# Patient Record
Sex: MALE | Race: WHITE | NOT HISPANIC OR LATINO | Employment: FULL TIME | URBAN - METROPOLITAN AREA
[De-identification: names, ages, dates, MRNs, and addresses within clinical notes are randomized per-mention and may not be internally consistent; named-entity substitution may affect disease eponyms.]

---

## 2017-07-14 ENCOUNTER — APPOINTMENT (OUTPATIENT)
Dept: URGENT CARE | Age: 57
End: 2017-07-14
Payer: OTHER MISCELLANEOUS

## 2017-07-14 ENCOUNTER — HOSPITAL ENCOUNTER (EMERGENCY)
Facility: HOSPITAL | Age: 57
Discharge: HOME | End: 2017-07-14
Attending: EMERGENCY MEDICINE
Payer: OTHER MISCELLANEOUS

## 2017-07-14 ENCOUNTER — TRANSCRIBE ORDERS (OUTPATIENT)
Dept: URGENT CARE | Age: 57
End: 2017-07-14

## 2017-07-14 ENCOUNTER — APPOINTMENT (EMERGENCY)
Dept: RADIOLOGY | Facility: HOSPITAL | Age: 57
End: 2017-07-14
Payer: OTHER MISCELLANEOUS

## 2017-07-14 VITALS
BODY MASS INDEX: 27.11 KG/M2 | HEIGHT: 69 IN | HEART RATE: 68 BPM | RESPIRATION RATE: 18 BRPM | WEIGHT: 183 LBS | DIASTOLIC BLOOD PRESSURE: 71 MMHG | SYSTOLIC BLOOD PRESSURE: 113 MMHG | TEMPERATURE: 97.7 F | OXYGEN SATURATION: 97 %
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
## 2017-07-14 DIAGNOSIS — M47.817 DJD (DEGENERATIVE JOINT DISEASE), LUMBOSACRAL: ICD-10-CM

## 2017-07-14 DIAGNOSIS — S39.012A LUMBOSACRAL STRAIN, INITIAL ENCOUNTER: Primary | ICD-10-CM

## 2017-07-14 PROCEDURE — 99284 EMERGENCY DEPT VISIT MOD MDM: CPT

## 2017-07-14 PROCEDURE — 51798 US URINE CAPACITY MEASURE: CPT

## 2017-07-14 PROCEDURE — 99283 EMERGENCY DEPT VISIT LOW MDM: CPT | Performed by: PREVENTIVE MEDICINE

## 2017-07-14 PROCEDURE — 74176 CT ABD & PELVIS W/O CONTRAST: CPT

## 2017-07-14 PROCEDURE — G0382 LEV 3 HOSP TYPE B ED VISIT: HCPCS | Performed by: PREVENTIVE MEDICINE

## 2017-07-24 ENCOUNTER — APPOINTMENT (OUTPATIENT)
Dept: URGENT CARE | Age: 57
End: 2017-07-24
Payer: OTHER MISCELLANEOUS

## 2017-07-24 PROCEDURE — 99214 OFFICE O/P EST MOD 30 MIN: CPT | Performed by: PREVENTIVE MEDICINE

## 2017-08-03 ENCOUNTER — APPOINTMENT (OUTPATIENT)
Dept: PHYSICAL THERAPY | Facility: CLINIC | Age: 57
End: 2017-08-03
Payer: OTHER MISCELLANEOUS

## 2017-08-03 PROCEDURE — 97140 MANUAL THERAPY 1/> REGIONS: CPT

## 2017-08-03 PROCEDURE — 97162 PT EVAL MOD COMPLEX 30 MIN: CPT

## 2017-08-07 ENCOUNTER — APPOINTMENT (OUTPATIENT)
Dept: PHYSICAL THERAPY | Facility: CLINIC | Age: 57
End: 2017-08-07
Payer: OTHER MISCELLANEOUS

## 2017-08-07 PROCEDURE — 97110 THERAPEUTIC EXERCISES: CPT

## 2017-08-09 ENCOUNTER — APPOINTMENT (OUTPATIENT)
Dept: LAB | Facility: HOSPITAL | Age: 57
End: 2017-08-09
Attending: UROLOGY
Payer: COMMERCIAL

## 2017-08-09 ENCOUNTER — ALLSCRIPTS OFFICE VISIT (OUTPATIENT)
Dept: OTHER | Facility: OTHER | Age: 57
End: 2017-08-09

## 2017-08-09 DIAGNOSIS — N30.00 ACUTE CYSTITIS WITHOUT HEMATURIA: ICD-10-CM

## 2017-08-09 LAB
BILIRUB UR QL STRIP: NORMAL
CLARITY UR: NORMAL
COLOR UR: YELLOW
GLUCOSE (HISTORICAL): NORMAL
HGB UR QL STRIP.AUTO: NORMAL
KETONES UR STRIP-MCNC: NORMAL MG/DL
LEUKOCYTE ESTERASE UR QL STRIP: NORMAL
NITRITE UR QL STRIP: NORMAL
PH UR STRIP.AUTO: 6 [PH]
PROT UR STRIP-MCNC: NORMAL MG/DL
SP GR UR STRIP.AUTO: 1.02
UROBILINOGEN UR QL STRIP.AUTO: 0.2

## 2017-08-09 PROCEDURE — 87086 URINE CULTURE/COLONY COUNT: CPT

## 2017-08-10 LAB — BACTERIA UR CULT: NORMAL

## 2017-08-14 ENCOUNTER — APPOINTMENT (OUTPATIENT)
Dept: PHYSICAL THERAPY | Facility: CLINIC | Age: 57
End: 2017-08-14
Payer: OTHER MISCELLANEOUS

## 2017-08-16 ENCOUNTER — APPOINTMENT (OUTPATIENT)
Dept: PHYSICAL THERAPY | Facility: CLINIC | Age: 57
End: 2017-08-16
Payer: OTHER MISCELLANEOUS

## 2017-08-16 PROCEDURE — 97110 THERAPEUTIC EXERCISES: CPT

## 2017-08-16 PROCEDURE — 97140 MANUAL THERAPY 1/> REGIONS: CPT

## 2017-08-17 ENCOUNTER — APPOINTMENT (OUTPATIENT)
Dept: PHYSICAL THERAPY | Facility: CLINIC | Age: 57
End: 2017-08-17
Payer: OTHER MISCELLANEOUS

## 2017-08-17 PROCEDURE — 97110 THERAPEUTIC EXERCISES: CPT

## 2017-08-28 ENCOUNTER — APPOINTMENT (OUTPATIENT)
Dept: PHYSICAL THERAPY | Facility: CLINIC | Age: 57
End: 2017-08-28
Payer: OTHER MISCELLANEOUS

## 2017-08-28 PROCEDURE — 97110 THERAPEUTIC EXERCISES: CPT

## 2017-08-28 PROCEDURE — 97140 MANUAL THERAPY 1/> REGIONS: CPT

## 2017-08-30 ENCOUNTER — APPOINTMENT (OUTPATIENT)
Dept: PHYSICAL THERAPY | Facility: CLINIC | Age: 57
End: 2017-08-30
Payer: OTHER MISCELLANEOUS

## 2017-08-30 PROCEDURE — 97140 MANUAL THERAPY 1/> REGIONS: CPT

## 2017-08-30 PROCEDURE — 97110 THERAPEUTIC EXERCISES: CPT

## 2017-09-05 ENCOUNTER — APPOINTMENT (OUTPATIENT)
Dept: PHYSICAL THERAPY | Facility: CLINIC | Age: 57
End: 2017-09-05
Payer: OTHER MISCELLANEOUS

## 2017-09-05 PROCEDURE — 97164 PT RE-EVAL EST PLAN CARE: CPT

## 2017-09-05 PROCEDURE — 97110 THERAPEUTIC EXERCISES: CPT

## 2017-09-07 ENCOUNTER — APPOINTMENT (OUTPATIENT)
Dept: PHYSICAL THERAPY | Facility: CLINIC | Age: 57
End: 2017-09-07
Payer: OTHER MISCELLANEOUS

## 2017-09-07 PROCEDURE — 97110 THERAPEUTIC EXERCISES: CPT

## 2017-09-07 PROCEDURE — 97140 MANUAL THERAPY 1/> REGIONS: CPT

## 2017-09-12 ENCOUNTER — APPOINTMENT (OUTPATIENT)
Dept: URGENT CARE | Age: 57
End: 2017-09-12
Payer: OTHER MISCELLANEOUS

## 2017-09-12 PROCEDURE — 99213 OFFICE O/P EST LOW 20 MIN: CPT | Performed by: PREVENTIVE MEDICINE

## 2017-09-14 ENCOUNTER — APPOINTMENT (OUTPATIENT)
Dept: PHYSICAL THERAPY | Facility: CLINIC | Age: 57
End: 2017-09-14
Payer: OTHER MISCELLANEOUS

## 2017-09-14 PROCEDURE — 97140 MANUAL THERAPY 1/> REGIONS: CPT

## 2017-09-14 PROCEDURE — 97110 THERAPEUTIC EXERCISES: CPT

## 2017-09-25 ENCOUNTER — ALLSCRIPTS OFFICE VISIT (OUTPATIENT)
Dept: OTHER | Facility: OTHER | Age: 57
End: 2017-09-25

## 2018-01-14 VITALS
DIASTOLIC BLOOD PRESSURE: 94 MMHG | SYSTOLIC BLOOD PRESSURE: 143 MMHG | HEART RATE: 78 BPM | WEIGHT: 180 LBS | HEIGHT: 69 IN | BODY MASS INDEX: 26.66 KG/M2

## 2018-01-22 VITALS
SYSTOLIC BLOOD PRESSURE: 128 MMHG | HEIGHT: 69 IN | WEIGHT: 177 LBS | BODY MASS INDEX: 26.22 KG/M2 | DIASTOLIC BLOOD PRESSURE: 78 MMHG

## 2018-02-09 ENCOUNTER — OFFICE VISIT (OUTPATIENT)
Dept: UROLOGY | Facility: MEDICAL CENTER | Age: 58
End: 2018-02-09
Payer: COMMERCIAL

## 2018-02-09 VITALS
BODY MASS INDEX: 26.66 KG/M2 | WEIGHT: 180 LBS | DIASTOLIC BLOOD PRESSURE: 74 MMHG | SYSTOLIC BLOOD PRESSURE: 112 MMHG | HEIGHT: 69 IN

## 2018-02-09 DIAGNOSIS — N13.8 BPH WITH OBSTRUCTION/LOWER URINARY TRACT SYMPTOMS: ICD-10-CM

## 2018-02-09 DIAGNOSIS — N40.1 BPH WITH OBSTRUCTION/LOWER URINARY TRACT SYMPTOMS: ICD-10-CM

## 2018-02-09 DIAGNOSIS — N41.0 PROSTATITIS, ACUTE: Primary | ICD-10-CM

## 2018-02-09 LAB
POST-VOID RESIDUAL VOLUME, ML POC: 76 ML
SL AMB  POCT GLUCOSE, UA: NEGATIVE
SL AMB LEUKOCYTE ESTERASE,UA: NEGATIVE
SL AMB POCT BILIRUBIN,UA: NEGATIVE
SL AMB POCT BLOOD,UA: NEGATIVE
SL AMB POCT CLARITY,UA: CLEAR
SL AMB POCT COLOR,UA: YELLOW
SL AMB POCT KETONES,UA: NEGATIVE
SL AMB POCT NITRITE,UA: NEGATIVE
SL AMB POCT PH,UA: 6.5
SL AMB POCT SPECIFIC GRAVITY,UA: 1.02
SL AMB POCT URINE PROTEIN: NEGATIVE
SL AMB POCT UROBILINOGEN: 0.2

## 2018-02-09 PROCEDURE — 99214 OFFICE O/P EST MOD 30 MIN: CPT | Performed by: UROLOGY

## 2018-02-09 PROCEDURE — 81003 URINALYSIS AUTO W/O SCOPE: CPT | Performed by: UROLOGY

## 2018-02-09 RX ORDER — SULFAMETHOXAZOLE AND TRIMETHOPRIM 800; 160 MG/1; MG/1
1 TABLET ORAL 2 TIMES DAILY
Qty: 28 TABLET | Refills: 0 | Status: SHIPPED | OUTPATIENT
Start: 2018-02-09 | End: 2018-03-09

## 2018-02-09 NOTE — LETTER
2018     MD Jacky Uriarte   [address]  [address] [facility]    Patient: Jaren Macedo   YOB: 1960   Date of Visit: 2018       Dear Dr Bennie Alexandre: Thank you for referring Jaren Macedo to me for evaluation  Below are my notes for this consultation  If you have questions, please do not hesitate to call me  I look forward to following your patient along with you  Sincerely,        Mike Goode MD        CC: No Recipients  Mike Goode MD  2018  5:03 PM  Sign at close encounter  Assessment/Plan:    Assessment:  1  Acute prostatitis  2  Marked changes in voiding symptoms  3  Marked changes in physical exam of the prostate when compared to prostate exam is dating back to      3   Family history of prostate cancer    Plan:  1  Bactrim DS, 1 b i d  for 4 weeks  2  Return in 6-8 weeks for reassessment  Make a decision on prostate ultrasound with biopsy at that time              Subjective:      Patient ID: Jaren Macedo is a 65 y o  male  HPI    This patient is followed annually for BPH and a family history of prostate cancer  Since his last visit in 2017, his urinary symptoms have worsened significantly  Primary problems include frequency, urgency and a weak urinary stream   He has no dysuria or hematuria  He has no systemic symptoms  The patient's father  of prostate cancer at the age of 80  The following portions of the patient's history were reviewed and updated as appropriate: allergies, current medications, past family history, past medical history, past social history, past surgical history and problem list     Review of Systems   Constitutional: Negative for activity change and fatigue  Respiratory: Negative for shortness of breath and wheezing  Cardiovascular: Negative for chest pain  Gastrointestinal: Negative for abdominal pain     Genitourinary: Negative for difficulty urinating, dysuria, frequency, hematuria and urgency  Musculoskeletal: Negative for back pain and gait problem  Skin: Negative  Allergic/Immunologic: Negative  Neurological: Negative  Psychiatric/Behavioral: Negative  Objective:    Vitals:    02/09/18 1444   BP: 112/74        Physical Exam   Constitutional: He is oriented to person, place, and time  He appears well-developed and well-nourished  HENT:   Head: Normocephalic and atraumatic  Pulmonary/Chest: Effort normal    Abdominal: Soft  Bowel sounds are normal    Genitourinary:   Genitourinary Comments: Prostate is approximately 50 g in size  The right lateral lobe is significantly larger than the left lateral lobe  The texture is mildly irregular and the patient seemed to have some discomfort during that phase of the exam   Findings are consistent with acute prostatitis  Musculoskeletal: Normal range of motion  Neurological: He is alert and oriented to person, place, and time  Skin: Skin is warm and dry  Psychiatric: He has a normal mood and affect   His behavior is normal  Judgment and thought content normal

## 2018-02-09 NOTE — PROGRESS NOTES
IPSS Questionnaire (AUA-7): Over the past month    1)  How often have you had a sensation of not emptying your bladder completely after you finish urinating? 2 - Less than half the time   2)  How often have you had to urinate again less than two hours after you finished urinating? 3 - About half the time   3)  How often have you found you stopped and started again several times when you urinated? 3 - About half the time   4) How difficult have you found it to postpone urination? 5 - Almost always   5) How often have you had a weak urinary stream?  4 - More than half the time   6) How often have you had to push or strain to begin urination? 3 - About half the time   7) How many times did you most typically get up to urinate from the time you went to bed until the time you got up in the morning?   2 - 2 times   Total Score:  22

## 2018-02-09 NOTE — LETTER
2018     MD Jacky Mccoy   [address]  [address] [facility]    Patient: Perla Amaya   YOB: 1960   Date of Visit: 2018       Dear Dr Cathy Sheth: Thank you for referring Perla Amaya to me for evaluation  Below are my notes for this consultation  If you have questions, please do not hesitate to call me  I look forward to following your patient along with you  Sincerely,        Iman Ellison MD        CC: No Recipients  Iman Ellison MD  2018  5:03 PM  Sign at close encounter  Assessment/Plan:    Assessment:  1  Acute prostatitis  2  Marked changes in voiding symptoms  3  Marked changes in physical exam of the prostate when compared to prostate exam is dating back to      3   Family history of prostate cancer    Plan:  1  Bactrim DS, 1 b i d  for 4 weeks  2  Return in 6-8 weeks for reassessment  Make a decision on prostate ultrasound with biopsy at that time              Subjective:      Patient ID: Perla Amaya is a 65 y o  male  HPI    This patient is followed annually for BPH and a family history of prostate cancer  Since his last visit in 2017, his urinary symptoms have worsened significantly  Primary problems include frequency, urgency and a weak urinary stream   He has no dysuria or hematuria  He has no systemic symptoms  The patient's father  of prostate cancer at the age of 80  The following portions of the patient's history were reviewed and updated as appropriate: allergies, current medications, past family history, past medical history, past social history, past surgical history and problem list     Review of Systems   Constitutional: Negative for activity change and fatigue  Respiratory: Negative for shortness of breath and wheezing  Cardiovascular: Negative for chest pain  Gastrointestinal: Negative for abdominal pain     Genitourinary: Negative for difficulty urinating, dysuria, frequency, hematuria and urgency  Musculoskeletal: Negative for back pain and gait problem  Skin: Negative  Allergic/Immunologic: Negative  Neurological: Negative  Psychiatric/Behavioral: Negative  Objective:    Vitals:    02/09/18 1444   BP: 112/74        Physical Exam   Constitutional: He is oriented to person, place, and time  He appears well-developed and well-nourished  HENT:   Head: Normocephalic and atraumatic  Pulmonary/Chest: Effort normal    Abdominal: Soft  Bowel sounds are normal    Genitourinary:   Genitourinary Comments: Prostate is approximately 50 g in size  The right lateral lobe is significantly larger than the left lateral lobe  The texture is mildly irregular and the patient seemed to have some discomfort during that phase of the exam   Findings are consistent with acute prostatitis  Musculoskeletal: Normal range of motion  Neurological: He is alert and oriented to person, place, and time  Skin: Skin is warm and dry  Psychiatric: He has a normal mood and affect   His behavior is normal  Judgment and thought content normal

## 2018-02-09 NOTE — PROGRESS NOTES
Assessment/Plan:    Assessment:  1  Acute prostatitis  2  Marked changes in voiding symptoms  3  Marked changes in physical exam of the prostate when compared to prostate exam is dating back to      3   Family history of prostate cancer    Plan:  1  Bactrim DS, 1 b i d  for 4 weeks  2  Return in 6-8 weeks for reassessment  Make a decision on prostate ultrasound with biopsy at that time              Subjective:      Patient ID: Eddie Orellana is a 65 y o  male  HPI    This patient is followed annually for BPH and a family history of prostate cancer  Since his last visit in 2017, his urinary symptoms have worsened significantly  Primary problems include frequency, urgency and a weak urinary stream   He has no dysuria or hematuria  He has no systemic symptoms  The patient's father  of prostate cancer at the age of 80  The following portions of the patient's history were reviewed and updated as appropriate: allergies, current medications, past family history, past medical history, past social history, past surgical history and problem list     Review of Systems   Constitutional: Negative for activity change and fatigue  Respiratory: Negative for shortness of breath and wheezing  Cardiovascular: Negative for chest pain  Gastrointestinal: Negative for abdominal pain  Genitourinary: Negative for difficulty urinating, dysuria, frequency, hematuria and urgency  Musculoskeletal: Negative for back pain and gait problem  Skin: Negative  Allergic/Immunologic: Negative  Neurological: Negative  Psychiatric/Behavioral: Negative  Objective:    Vitals:    18 1444   BP: 112/74        Physical Exam   Constitutional: He is oriented to person, place, and time  He appears well-developed and well-nourished  HENT:   Head: Normocephalic and atraumatic  Pulmonary/Chest: Effort normal    Abdominal: Soft   Bowel sounds are normal    Genitourinary:   Genitourinary Comments: Prostate is approximately 50 g in size  The right lateral lobe is significantly larger than the left lateral lobe  The texture is mildly irregular and the patient seemed to have some discomfort during that phase of the exam   Findings are consistent with acute prostatitis  Musculoskeletal: Normal range of motion  Neurological: He is alert and oriented to person, place, and time  Skin: Skin is warm and dry  Psychiatric: He has a normal mood and affect   His behavior is normal  Judgment and thought content normal

## 2018-04-18 ENCOUNTER — OFFICE VISIT (OUTPATIENT)
Dept: UROLOGY | Facility: MEDICAL CENTER | Age: 58
End: 2018-04-18
Payer: COMMERCIAL

## 2018-04-18 VITALS
DIASTOLIC BLOOD PRESSURE: 80 MMHG | SYSTOLIC BLOOD PRESSURE: 132 MMHG | WEIGHT: 180.8 LBS | BODY MASS INDEX: 26.78 KG/M2 | HEIGHT: 69 IN

## 2018-04-18 DIAGNOSIS — N41.0 ACUTE PROSTATITIS: ICD-10-CM

## 2018-04-18 DIAGNOSIS — R39.12 WEAK URINARY STREAM: Primary | ICD-10-CM

## 2018-04-18 LAB
SL AMB  POCT GLUCOSE, UA: NEGATIVE
SL AMB LEUKOCYTE ESTERASE,UA: NEGATIVE
SL AMB POCT BILIRUBIN,UA: NEGATIVE
SL AMB POCT BLOOD,UA: NEGATIVE
SL AMB POCT CLARITY,UA: CLEAR
SL AMB POCT COLOR,UA: YELLOW
SL AMB POCT KETONES,UA: NEGATIVE
SL AMB POCT NITRITE,UA: NEGATIVE
SL AMB POCT PH,UA: 6
SL AMB POCT SPECIFIC GRAVITY,UA: 1.02
SL AMB POCT URINE PROTEIN: NEGATIVE
SL AMB POCT UROBILINOGEN: 0.2

## 2018-04-18 PROCEDURE — 81003 URINALYSIS AUTO W/O SCOPE: CPT | Performed by: UROLOGY

## 2018-04-18 PROCEDURE — 99213 OFFICE O/P EST LOW 20 MIN: CPT | Performed by: UROLOGY

## 2018-04-18 RX ORDER — TAMSULOSIN HYDROCHLORIDE 0.4 MG/1
0.4 CAPSULE ORAL EVERY EVENING
Qty: 30 CAPSULE | Refills: 1 | Status: SHIPPED | OUTPATIENT
Start: 2018-04-18 | End: 2018-12-04 | Stop reason: DRUGHIGH

## 2018-04-18 RX ORDER — DICYCLOMINE HYDROCHLORIDE 10 MG/1
CAPSULE ORAL 3 TIMES DAILY PRN
Refills: 1 | COMMUNITY
Start: 2018-02-10

## 2018-04-18 NOTE — LETTER
April 18, 2018     Aury Petersen MD  [address] [facility]  [address] [place] [address]    Patient: Robert Thorne   YOB: 1960   Date of Visit: 4/18/2018       Dear Dr Flaca Leger: Thank you for referring Robert Thorne to me for evaluation  Below are my notes for this consultation  If you have questions, please do not hesitate to call me  I look forward to following your patient along with you  Sincerely,        Luis Montes MD        CC: No Recipients  Luis Montes MD  4/18/2018  3:25 PM  Sign at close encounter  Assessment/Plan:    Assessment:  1  Weak urinary stream  2  Urgency  3  Nocturia  4  Probable prostatic hyperplasia with obstruction accounting for symptoms above    Plan:  1  Trial of tamsulosin  2  Office cystoscopy    No problem-specific Assessment & Plan notes found for this encounter  Diagnoses and all orders for this visit:    Weak urinary stream    Acute prostatitis  -     POCT urine dip auto non-scope    Other orders  -     dicyclomine (BENTYL) 10 mg capsule; Take by mouth 3 (three) times a day as needed          Subjective:      Patient ID: Robert Thorne is a 65 y o  male  HPI     Bactrim helped somewhat  AUA Score is  no better  Discussed cysto and pt is now ready to have this one to reieve sx  The only other choice is to adjust intake, but that leaves the problem undiagnosed  No dysuria or blood  Will try tamsulosin until cysto  The following portions of the patient's history were reviewed and updated as appropriate: allergies, current medications, past family history, past medical history, past social history, past surgical history and problem list     Review of Systems   Constitutional: Negative for activity change and fatigue  Respiratory: Negative for shortness of breath and wheezing  Cardiovascular: Negative for chest pain  Gastrointestinal: Negative for abdominal pain     Genitourinary: Negative for difficulty urinating, dysuria, frequency, hematuria and urgency  Musculoskeletal: Negative for back pain and gait problem  Skin: Negative  Allergic/Immunologic: Negative  Neurological: Negative  Psychiatric/Behavioral: Negative  Objective:      /80 (BP Location: Left arm, Patient Position: Sitting, Cuff Size: Standard)   Ht 5' 9" (1 753 m)   Wt 82 kg (180 lb 12 8 oz)   BMI 26 70 kg/m²           Physical Exam   Constitutional: He is oriented to person, place, and time  He appears well-developed and well-nourished  HENT:   Head: Normocephalic and atraumatic  Neck: Normal range of motion  Neck supple  Pulmonary/Chest: Effort normal    Musculoskeletal: Normal range of motion  Neurological: He is alert and oriented to person, place, and time  He has normal reflexes  Skin: Skin is warm and dry  Psychiatric: He has a normal mood and affect   His behavior is normal  Judgment and thought content normal

## 2018-04-18 NOTE — PROGRESS NOTES
IPSS Questionnaire (AUA-7): Over the past month    1)  How often have you had a sensation of not emptying your bladder completely after you finish urinating? 2 - Less than half the time   2)  How often have you had to urinate again less than two hours after you finished urinating? 3 - About half the time   3)  How often have you found you stopped and started again several times when you urinated? 4 - More than half the time   4) How difficult have you found it to postpone urination? 5 - Almost always   5) How often have you had a weak urinary stream?  5 - Almost always   6) How often have you had to push or strain to begin urination? 3 - About half the time   7) How many times did you most typically get up to urinate from the time you went to bed until the time you got up in the morning? 2 - 2 times   Total Score:  24     QOL: Mostly dissatisfied

## 2018-04-18 NOTE — PATIENT INSTRUCTIONS
Tamsulosin (By mouth)   Tamsulosin Hydrochloride ([id number]-sin [id number]-droe-KLOR-herson)  Treats benign prostatic hyperplasia (enlarged prostate)  Brand Name(s): Flomax   There may be other brand names for this medicine  When This Medicine Should Not Be Used: This medicine is not right for everyone  Do not use it if you had an allergic reaction to tamsulosin  How to Use This Medicine:   Capsule  · Take your medicine as directed  Your dose may need to be changed several times to find what works best for you  · Take this medicine 30 minutes after the same meal each day  Swallow the capsule whole  Do not crush, chew, or open it  · Read and follow the patient instructions that come with this medicine  Talk to your doctor or pharmacist if you have any questions  · Missed dose: Take a dose as soon as you remember  If it is almost time for your next dose, wait until then and take a regular dose  Do not take extra medicine to make up for a missed dose  If you forget to take this medicine for several days in a row, talk to your doctor before you start taking it again  · Store the medicine in a closed container at room temperature, away from heat, moisture, and direct light  Drugs and Foods to Avoid:   Ask your doctor or pharmacist before using any other medicine, including over-the-counter medicines, vitamins, and herbal products  · Some medicines can affect how tamsulosin works  Tell your doctor if you are using another alpha blocker medicine, cimetidine, erythromycin, ketoconazole, paroxetine, terbinafine, warfarin, or medicine for erectile dysfunction  Warnings While Using This Medicine:   · Tell your doctor if you have kidney disease, liver disease, low blood pressure, prostate cancer, or an allergy to sulfa drugs  · Tell your doctor if you plan to have cataract or glaucoma surgery  This medicine may cause eye problems during surgery  · This medicine may make you dizzy or lightheaded   Do not drive or do anything else that could be dangerous until you know how this medicine affects you  Stand or sit up slowly if you feel dizzy  · Your doctor will check your progress and the effects of this medicine at regular visits  Keep all appointments  · Keep all medicine out of the reach of children  Never share your medicine with anyone  Possible Side Effects While Using This Medicine:   Call your doctor right away if you notice any of these side effects:  · Allergic reaction: Itching or hives, swelling in your face or hands, swelling or tingling in your mouth or throat, chest tightness, trouble breathing  · Blistering, peeling, red skin rash  · Lightheadedness, dizziness, fainting  · Painful, prolonged erection of your penis  If you notice these less serious side effects, talk with your doctor:   · Headache  · Problems with ejaculation  · Runny or stuffy nose  If you notice other side effects that you think are caused by this medicine, tell your doctor  Call your doctor for medical advice about side effects  You may report side effects to FDA at 1-518-FDA-7194  © 2017 [employer] Information is for End User's use only and may not be sold, redistributed or otherwise used for commercial purposes  The above information is an  only  It is not intended as medical advice for individual conditions or treatments  Talk to your doctor, nurse or pharmacist before following any medical regimen to see if it is safe and effective for you

## 2018-04-18 NOTE — PROGRESS NOTES
Assessment/Plan:    Assessment:  1  Weak urinary stream  2  Urgency  3  Nocturia  4  Probable prostatic hyperplasia with obstruction accounting for symptoms above    Plan:  1  Trial of tamsulosin  2  Office cystoscopy    No problem-specific Assessment & Plan notes found for this encounter  Diagnoses and all orders for this visit:    Weak urinary stream    Acute prostatitis  -     POCT urine dip auto non-scope    Other orders  -     dicyclomine (BENTYL) 10 mg capsule; Take by mouth 3 (three) times a day as needed          Subjective:      Patient ID: Fermin Cain is a 65 y o  male  HPI     Bactrim helped somewhat  AUA Score is  no better  Discussed cysto and pt is now ready to have this one to reieve sx  The only other choice is to adjust intake, but that leaves the problem undiagnosed  No dysuria or blood  Will try tamsulosin until cysto  The following portions of the patient's history were reviewed and updated as appropriate: allergies, current medications, past family history, past medical history, past social history, past surgical history and problem list     Review of Systems   Constitutional: Negative for activity change and fatigue  Respiratory: Negative for shortness of breath and wheezing  Cardiovascular: Negative for chest pain  Gastrointestinal: Negative for abdominal pain  Genitourinary: Negative for difficulty urinating, dysuria, frequency, hematuria and urgency  Musculoskeletal: Negative for back pain and gait problem  Skin: Negative  Allergic/Immunologic: Negative  Neurological: Negative  Psychiatric/Behavioral: Negative  Objective:      /80 (BP Location: Left arm, Patient Position: Sitting, Cuff Size: Standard)   Ht 5' 9" (1 753 m)   Wt 82 kg (180 lb 12 8 oz)   BMI 26 70 kg/m²          Physical Exam   Constitutional: He is oriented to person, place, and time  He appears well-developed and well-nourished     HENT:   Head: Normocephalic and atraumatic  Neck: Normal range of motion  Neck supple  Pulmonary/Chest: Effort normal    Musculoskeletal: Normal range of motion  Neurological: He is alert and oriented to person, place, and time  He has normal reflexes  Skin: Skin is warm and dry  Psychiatric: He has a normal mood and affect   His behavior is normal  Judgment and thought content normal

## 2018-05-04 ENCOUNTER — PROCEDURE VISIT (OUTPATIENT)
Dept: UROLOGY | Facility: MEDICAL CENTER | Age: 58
End: 2018-05-04
Payer: OTHER MISCELLANEOUS

## 2018-05-04 VITALS
SYSTOLIC BLOOD PRESSURE: 116 MMHG | HEIGHT: 69 IN | WEIGHT: 183 LBS | DIASTOLIC BLOOD PRESSURE: 82 MMHG | BODY MASS INDEX: 27.11 KG/M2

## 2018-05-04 DIAGNOSIS — N41.0 ACUTE PROSTATITIS: ICD-10-CM

## 2018-05-04 DIAGNOSIS — N13.8 BPH WITH OBSTRUCTION/LOWER URINARY TRACT SYMPTOMS: Primary | ICD-10-CM

## 2018-05-04 DIAGNOSIS — N40.1 BPH WITH OBSTRUCTION/LOWER URINARY TRACT SYMPTOMS: Primary | ICD-10-CM

## 2018-05-04 LAB
SL AMB  POCT GLUCOSE, UA: NEGATIVE
SL AMB LEUKOCYTE ESTERASE,UA: NEGATIVE
SL AMB POCT BILIRUBIN,UA: NEGATIVE
SL AMB POCT BLOOD,UA: NEGATIVE
SL AMB POCT CLARITY,UA: CLEAR
SL AMB POCT COLOR,UA: YELLOW
SL AMB POCT KETONES,UA: NEGATIVE
SL AMB POCT NITRITE,UA: NEGATIVE
SL AMB POCT PH,UA: 5.5
SL AMB POCT SPECIFIC GRAVITY,UA: 1.01
SL AMB POCT URINE PROTEIN: NEGATIVE
SL AMB POCT UROBILINOGEN: 0.2

## 2018-05-04 PROCEDURE — 81003 URINALYSIS AUTO W/O SCOPE: CPT | Performed by: UROLOGY

## 2018-05-04 PROCEDURE — 52000 CYSTOURETHROSCOPY: CPT | Performed by: UROLOGY

## 2018-05-04 PROCEDURE — 99213 OFFICE O/P EST LOW 20 MIN: CPT | Performed by: UROLOGY

## 2018-05-04 RX ORDER — SILODOSIN 8 MG/1
1 CAPSULE ORAL
Qty: 28 CAPSULE | Refills: 0 | Status: SHIPPED | COMMUNITY
Start: 2018-05-04 | End: 2018-12-04 | Stop reason: SDUPTHER

## 2018-05-04 NOTE — PROGRESS NOTES
Procedures  Preoperative diagnosis prostatic hyperplasia with obstruction    Postoperative diagnosis:  Cap same    Cap operation:  Flexible cystoscopy    Surgeon:  Bobby Webster MD    Anesthesia:  Local    Procedure: The patient was brought to the cystoscopy room and identified  He was placed in the supine position and prepped and draped in sterile fashion  The urethra was anesthetized with xylocaine jelly  The flexible cystoscope was inserted  MALE FLEXIBLE CYSTOSCOPY    The patient was prepped and draped in sterile and 10 mL of 1% xylocaine jelly was instilled into the urethra  A penile clamp was applied  Penile Urethra:normal  Bulbar Urethra:normal  Prostate:  Occluded by symmetrically enlarged lateral lobes were about 1 cm  This is in significant obstruction  Bladder:        Bladder neck normal       Ureteral orifices normal       Mucosa normal       Trabeculation minimal    Impression:  Mild prostatic hyperplasia, no significant outflow obstruction

## 2018-05-04 NOTE — PROGRESS NOTES
IPSS Questionnaire (AUA-7): Over the past month    1)  How often have you had a sensation of not emptying your bladder completely after you finish urinating? 2 - Less than half the time   2)  How often have you had to urinate again less than two hours after you finished urinating? 3 - About half the time   3)  How often have you found you stopped and started again several times when you urinated? 3 - About half the time   4) How difficult have you found it to postpone urination? 3 - About half the time   5) How often have you had a weak urinary stream?  2 - Less than half the time   6) How often have you had to push or strain to begin urination? 3 - About half the time   7) How many times did you most typically get up to urinate from the time you went to bed until the time you got up in the morning? 2 - 2 times   Total Score:  18     QOL: Mixed

## 2018-05-04 NOTE — PROGRESS NOTES
Assessment/Plan:    Assessment:  1  Prostatic hyperplasia with minimal obstruction, no significant trabeculation    Plan:  1  Managed with medication  2  Switch from tamsulosin to Rapaflo 8 mg at bedtime and reassess symptoms  3  Possible finasteride in the future  4  Return in 1 year, sooner p r n  No problem-specific Assessment & Plan notes found for this encounter  Diagnoses and all orders for this visit:    Acute prostatitis  -     POCT urine dip auto non-scope          Subjective:      Patient ID: Emre Louis is a 65 y o  male  HPI  BPH:  The patient returns for re-evaluation of his urinary symptoms  AUA score today is 18, quality of life is mixed  His AUA score prior to tamsulosin was 24 in his quality of life was mostly dissatisfied  Clearly the tamsulosin has shown some benefit  Still sleepy the next morning after the HS dose  Does not know if he has retrograde ejaculation  Cystoscopy, the subject of a separate note, shows minimal prostatic enlargement without significant visual obstruction or obstructive changes in the bladder  Wants to try Rapaflo  Warned of failure to ejaculate  It will probably not make him as sleepy  We discussed a trial of  finasteride  At this point, we will try the Rapaflo 1st   By digital rectal exam, the patient's prostate is about 50 g so finasteride would certainly be appropriate  Acute prostatitis:  The patient's irritative urinary symptoms related to prostatitis have largely resolved  His residual urinary symptoms are noted  Further antibiotics are not indicated now  If his symptoms get worse instead of better, I would obtain a semen culture to try to identify the offending organism, if any  His residual urinary symptoms are more likely due to prostate and bladder malfunction then to infection/inflammation        The following portions of the patient's history were reviewed and updated as appropriate: allergies, current medications, past family history, past medical history, past social history, past surgical history and problem list     Review of Systems   Constitutional: Negative for activity change and fatigue  Respiratory: Negative for shortness of breath and wheezing  Cardiovascular: Negative for chest pain  Gastrointestinal: Negative for abdominal pain  Genitourinary: Negative for difficulty urinating, dysuria, frequency, hematuria and urgency  Musculoskeletal: Negative for back pain and gait problem  Skin: Negative  Allergic/Immunologic: Negative  Neurological: Negative  Psychiatric/Behavioral: Negative  Objective:      /82 (BP Location: Left arm, Patient Position: Sitting, Cuff Size: Standard)   Ht 5' 9" (1 753 m)   Wt 83 kg (183 lb)   BMI 27 02 kg/m²          Physical Exam   Constitutional: He is oriented to person, place, and time  He appears well-developed and well-nourished  HENT:   Head: Normocephalic and atraumatic  Neck: Normal range of motion  Neck supple  Pulmonary/Chest: Effort normal    Genitourinary: Penis normal    Genitourinary Comments: Scrotum and testes normal   Musculoskeletal: Normal range of motion  Neurological: He is alert and oriented to person, place, and time  He has normal reflexes  Skin: Skin is warm and dry  Psychiatric: He has a normal mood and affect   His behavior is normal  Judgment and thought content normal

## 2018-05-04 NOTE — LETTER
May 4, 2018     James Wells MD  [address]  [address] [place] [address]    Patient: Verónica Roa   YOB: 1960   Date of Visit: 5/4/2018       Dear Dr Iris Vidal: Thank you for referring Verónica Roa to me for evaluation  Below are my notes for this consultation  If you have questions, please do not hesitate to call me  I look forward to following your patient along with you  Sincerely,        Bin Aleman MD        CC: No Recipients  Bin Aleman MD  5/4/2018  4:44 PM  Sign at close encounter  Assessment/Plan:    Assessment:  1  Prostatic hyperplasia with minimal obstruction, no significant trabeculation    Plan:  1  Managed with medication  2  Switch from tamsulosin to Rapaflo 8 mg at bedtime and reassess symptoms  3  Possible finasteride in the future  4  Return in 1 year, sooner p r n  No problem-specific Assessment & Plan notes found for this encounter  Diagnoses and all orders for this visit:    Acute prostatitis  -     POCT urine dip auto non-scope          Subjective:      Patient ID: Verónica Roa is a 65 y o  male  HPI  BPH:  The patient returns for re-evaluation of his urinary symptoms  AUA score today is 18, quality of life is mixed  His AUA score prior to tamsulosin was 24 in his quality of life was mostly dissatisfied  Clearly the tamsulosin has shown some benefit  Still sleepy the next morning after the HS dose  Does not know if he has retrograde ejaculation  Cystoscopy, the subject of a separate note, shows minimal prostatic enlargement without significant visual obstruction or obstructive changes in the bladder  Wants to try Rapaflo  Warned of failure to ejaculate  It will probably not make him as sleepy  We discussed a trial of  finasteride  At this point, we will try the Rapaflo 1st   By digital rectal exam, the patient's prostate is about 50 g so finasteride would certainly be appropriate      Acute prostatitis:  The patient's irritative urinary symptoms related to prostatitis have largely resolved  His residual urinary symptoms are noted  Further antibiotics are not indicated now  If his symptoms get worse instead of better, I would obtain a semen culture to try to identify the offending organism, if any  His residual urinary symptoms are more likely due to prostate and bladder malfunction then to infection/inflammation  The following portions of the patient's history were reviewed and updated as appropriate: allergies, current medications, past family history, past medical history, past social history, past surgical history and problem list     Review of Systems   Constitutional: Negative for activity change and fatigue  Respiratory: Negative for shortness of breath and wheezing  Cardiovascular: Negative for chest pain  Gastrointestinal: Negative for abdominal pain  Genitourinary: Negative for difficulty urinating, dysuria, frequency, hematuria and urgency  Musculoskeletal: Negative for back pain and gait problem  Skin: Negative  Allergic/Immunologic: Negative  Neurological: Negative  Psychiatric/Behavioral: Negative  Objective:      /82 (BP Location: Left arm, Patient Position: Sitting, Cuff Size: Standard)   Ht 5' 9" (1 753 m)   Wt 83 kg (183 lb)   BMI 27 02 kg/m²           Physical Exam   Constitutional: He is oriented to person, place, and time  He appears well-developed and well-nourished  HENT:   Head: Normocephalic and atraumatic  Neck: Normal range of motion  Neck supple  Pulmonary/Chest: Effort normal    Genitourinary: Penis normal    Genitourinary Comments: Scrotum and testes normal   Musculoskeletal: Normal range of motion  Neurological: He is alert and oriented to person, place, and time  He has normal reflexes  Skin: Skin is warm and dry  Psychiatric: He has a normal mood and affect   His behavior is normal  Judgment and thought content normal          Lorna Klein MD  5/4/2018  2:36 PM  Sign at close encounter  Procedures  Preoperative diagnosis prostatic hyperplasia with obstruction    Postoperative diagnosis:  Cap same    Cap operation:  Flexible cystoscopy    Surgeon:  Muriel Ansari MD    Anesthesia:  Local    Procedure: The patient was brought to the cystoscopy room and identified  He was placed in the supine position and prepped and draped in sterile fashion  The urethra was anesthetized with xylocaine jelly  The flexible cystoscope was inserted  MALE FLEXIBLE CYSTOSCOPY    The patient was prepped and draped in sterile and 10 mL of 1% xylocaine jelly was instilled into the urethra  A penile clamp was applied  Penile Urethra:normal  Bulbar Urethra:normal  Prostate:  Occluded by symmetrically enlarged lateral lobes were about 1 cm  This is in significant obstruction  Bladder:        Bladder neck normal       Ureteral orifices normal       Mucosa normal       Trabeculation minimal    Impression:  Mild prostatic hyperplasia, no significant outflow obstruction

## 2018-12-04 DIAGNOSIS — N40.1 BPH WITH OBSTRUCTION/LOWER URINARY TRACT SYMPTOMS: ICD-10-CM

## 2018-12-04 DIAGNOSIS — N13.8 BPH WITH OBSTRUCTION/LOWER URINARY TRACT SYMPTOMS: ICD-10-CM

## 2018-12-04 RX ORDER — SILODOSIN 8 MG/1
1 CAPSULE ORAL
Qty: 90 CAPSULE | Refills: 2 | Status: SHIPPED | OUTPATIENT
Start: 2018-12-04 | End: 2019-05-22

## 2018-12-04 NOTE — TELEPHONE ENCOUNTER
Patient called requesting refill on Rapaflo 8mg  Patient was last seen in May, 2018; at that time he was switched from Tamsulosin 0 4mg to Rapaflo 8mg  Patient states he gets better symptom control using the Rapaflo    Request for same, 90 day supply with 2 refills was queued and forwarded to Dr Imelda Barthel for approval

## 2019-01-11 ENCOUNTER — TELEPHONE (OUTPATIENT)
Dept: UROLOGY | Facility: AMBULATORY SURGERY CENTER | Age: 59
End: 2019-01-11

## 2019-01-11 NOTE — TELEPHONE ENCOUNTER
Pt notified there is no generic  Rapaflo is not highly effective per the pt  Will direct to Dr Rafiq Jewell to recommend alternative

## 2019-01-14 NOTE — TELEPHONE ENCOUNTER
Rapaflo is indeed expensive  If it is not working, the patient should make an appointment to come back for a re-evaluation  I need to reconsider what's going on  When I saw him last time he had prostatitis  His symptom complex may be different now if his prostatitis has healed up  I would need to take a fresh look at this problem

## 2019-01-18 ENCOUNTER — OFFICE VISIT (OUTPATIENT)
Dept: UROLOGY | Facility: MEDICAL CENTER | Age: 59
End: 2019-01-18
Payer: COMMERCIAL

## 2019-01-18 VITALS
BODY MASS INDEX: 26.51 KG/M2 | WEIGHT: 179 LBS | HEIGHT: 69 IN | SYSTOLIC BLOOD PRESSURE: 130 MMHG | DIASTOLIC BLOOD PRESSURE: 80 MMHG | HEART RATE: 80 BPM

## 2019-01-18 DIAGNOSIS — N32.81 OVERACTIVE BLADDER: ICD-10-CM

## 2019-01-18 DIAGNOSIS — N13.8 BPH WITH OBSTRUCTION/LOWER URINARY TRACT SYMPTOMS: Primary | ICD-10-CM

## 2019-01-18 DIAGNOSIS — N40.1 BPH WITH OBSTRUCTION/LOWER URINARY TRACT SYMPTOMS: Primary | ICD-10-CM

## 2019-01-18 LAB
POST-VOID RESIDUAL VOLUME, ML POC: 0 ML
SL AMB  POCT GLUCOSE, UA: NORMAL
SL AMB LEUKOCYTE ESTERASE,UA: NORMAL
SL AMB POCT BILIRUBIN,UA: NORMAL
SL AMB POCT BLOOD,UA: NORMAL
SL AMB POCT CLARITY,UA: CLEAR
SL AMB POCT COLOR,UA: YELLOW
SL AMB POCT KETONES,UA: NORMAL
SL AMB POCT NITRITE,UA: NORMAL
SL AMB POCT PH,UA: 5.5
SL AMB POCT SPECIFIC GRAVITY,UA: 1.01
SL AMB POCT URINE PROTEIN: NORMAL
SL AMB POCT UROBILINOGEN: 0.2

## 2019-01-18 PROCEDURE — 81003 URINALYSIS AUTO W/O SCOPE: CPT | Performed by: UROLOGY

## 2019-01-18 PROCEDURE — 99213 OFFICE O/P EST LOW 20 MIN: CPT | Performed by: UROLOGY

## 2019-01-18 PROCEDURE — 51798 US URINE CAPACITY MEASURE: CPT | Performed by: UROLOGY

## 2019-01-18 RX ORDER — ALBUTEROL SULFATE 90 UG/1
1 AEROSOL, METERED RESPIRATORY (INHALATION) EVERY 4 HOURS PRN
COMMUNITY
Start: 2018-08-01 | End: 2019-08-01

## 2019-01-18 RX ORDER — TAMSULOSIN HYDROCHLORIDE 0.4 MG/1
0.4 CAPSULE ORAL EVERY EVENING
Qty: 90 CAPSULE | Refills: 3 | Status: SHIPPED | OUTPATIENT
Start: 2019-01-18 | End: 2019-01-23 | Stop reason: SDUPTHER

## 2019-01-18 RX ORDER — OXYBUTYNIN CHLORIDE 5 MG/1
5 TABLET ORAL 3 TIMES DAILY PRN
Qty: 90 TABLET | Refills: 1 | Status: SHIPPED | OUTPATIENT
Start: 2019-01-18 | End: 2019-05-22

## 2019-01-18 NOTE — PROGRESS NOTES
Assessment/Plan:    Overactive bladder  Considering patient's postvoid residual of 0 and has symptoms of incomplete emptying, and the failure of alpha blockers to relieve him, I would like to treat him for overactive bladder with oxybutynin  He was also given a prescription for t[facility]ulosin which [facility]  The patient will keep his regular annual checkup in May  Diagnoses and all orders for this visit:    BPH with obstruction/lower urinary tract symptoms  -     POCT urine dip auto non-scope  -     oxybutynin (DITROPAN) 5 mg tablet; Take 1 tablet (5 mg total) by mouth 3 (three) times a day as needed (urinary frequency)  -     tamsulosin (FLOMAX) 0 4 mg; Take 1 capsule (0 4 mg total) by mouth every evening  -     POCT Measure PVR    Overactive bladder    Other orders  -     albuterol (VENTOLIN HFA) 90 mcg/act inhaler; Inhale 1 puff every 4 (four) hours as needed          Subjective:      Patient ID: Escobar Lassiter is a 63 y o  male  HPI  Voiding dysfunction:  The patient was evaluated by me in May 2018 for symptoms of prostatic hyperplasia with obstruction  He had been taking tamsulosin without relief  A cystoscopy showed mild prostatic hyperplasia with minimal outflow obstruction  A trial of Rapaflo really did not improve his symptoms  The patient returns today for re-evaluation  He notes urinary frequency, incomplete emptying and weak stream   He denies other significant urinary symptoms  He denies gross hematuria, urinary tract infections or incontinence  He is taking Rapaflo for his symptoms  Double voids for small amts about 1/2 of the time  Knows most of the BR's in town  Nocturnal volumes are same a daytime  Stream is slow  PVR=0          The following portions of the patient's history were reviewed and updated as appropriate: allergies, current medications, past family history, past medical history, past social history, past surgical history and problem list     Review of Systems      Objective:      /80   Pulse 80   Ht 5' 9" (1 753 m)   Wt 81 2 kg (179 lb)   BMI 26 43 kg/m²          Physical Exam

## 2019-01-18 NOTE — ASSESSMENT & PLAN NOTE
Considering patient's postvoid residual of 0 and has symptoms of incomplete emptying, and the failure of alpha blockers to relieve him, I would like to treat him for overactive bladder with oxybutynin  He was also given a prescription for tamsulosin which he may resume at [facility]  The patient will keep his regular annual checkup in May

## 2019-01-18 NOTE — LETTER
January 18, 2019     Bailee Sanchez MD  [id number]    Patient: Perla Amaya   YOB: 1960   Date of Visit: 1/18/2019       Dear Dr Cathy Sheth: Thank you for referring Perla Amaya to me for evaluation  Below are my notes for this consultation  If you have questions, please do not hesitate to call me  I look forward to following your patient along with you  Sincerely,        Iman Ellison MD        CC: No Recipients  Iman Ellison MD  1/18/2019 10:43 AM  Incomplete  Assessment/Plan:    No problem-specific Assessment & Plan notes found for this encounter  Diagnoses and all orders for this visit:    BPH with obstruction/lower urinary tract symptoms  -     POCT urine dip auto non-scope    Other orders  -     albuterol (VENTOLIN HFA) 90 mcg/act inhaler; Inhale 1 puff every 4 (four) hours as needed          Subjective:      Patient ID: Perla Amaya is a 63 y o  male  HPI  Voiding dysfunction:  The patient was evaluated by me in May 2018 for symptoms of prostatic hyperplasia with obstruction  He had been taking tamsulosin without relief  A cystoscopy showed mild prostatic hyperplasia with minimal outflow obstruction  A trial of Rapaflo really did not improve his symptoms  The patient returns today for re-evaluation  He notes urinary frequency, incomplete emptying and weak stream   He denies other significant urinary symptoms  He denies gross hematuria, urinary tract infections or incontinence  He is taking Rapaflo for his symptoms  Double voids for small amts about 1/2 of the time  Knows most of the BR's in town  Nocturnal volumes are same a daytime  Stream is slow  PVR=0          The following portions of the patient's history were reviewed and updated as appropriate: allergies, current medications, past family history, past medical history, past social history, past surgical history and problem list     Review of Systems      Objective:      /80   Pulse 80   Ht 5' 9" (1 753 m)   Wt 81 2 kg (179 lb)   BMI 26 43 kg/m²           Physical Exam

## 2019-01-23 DIAGNOSIS — N13.8 BPH WITH OBSTRUCTION/LOWER URINARY TRACT SYMPTOMS: ICD-10-CM

## 2019-01-23 DIAGNOSIS — N40.1 BPH WITH OBSTRUCTION/LOWER URINARY TRACT SYMPTOMS: ICD-10-CM

## 2019-01-23 RX ORDER — TAMSULOSIN HYDROCHLORIDE 0.4 MG/1
0.4 CAPSULE ORAL EVERY EVENING
Qty: 90 CAPSULE | Refills: 3 | Status: SHIPPED | OUTPATIENT
Start: 2019-01-23 | End: 2019-05-22 | Stop reason: SDUPTHER

## 2019-01-31 ENCOUNTER — TELEPHONE (OUTPATIENT)
Dept: UROLOGY | Facility: MEDICAL CENTER | Age: 59
End: 2019-01-31

## 2019-01-31 NOTE — TELEPHONE ENCOUNTER
Script dropped to PAPER instead of being E-scribed to [facility]  Script called into [facility]; verbal order given to ALBERTO DRUMMOND  Ph   Patient aware of same

## 2019-05-22 ENCOUNTER — OFFICE VISIT (OUTPATIENT)
Dept: UROLOGY | Facility: MEDICAL CENTER | Age: 59
End: 2019-05-22
Payer: COMMERCIAL

## 2019-05-22 VITALS
HEIGHT: 69 IN | DIASTOLIC BLOOD PRESSURE: 78 MMHG | SYSTOLIC BLOOD PRESSURE: 130 MMHG | BODY MASS INDEX: 26.66 KG/M2 | WEIGHT: 180 LBS

## 2019-05-22 DIAGNOSIS — N13.8 BPH WITH OBSTRUCTION/LOWER URINARY TRACT SYMPTOMS: Primary | ICD-10-CM

## 2019-05-22 DIAGNOSIS — N32.81 OVERACTIVE BLADDER: ICD-10-CM

## 2019-05-22 DIAGNOSIS — N40.1 BPH WITH OBSTRUCTION/LOWER URINARY TRACT SYMPTOMS: Primary | ICD-10-CM

## 2019-05-22 LAB
SL AMB  POCT GLUCOSE, UA: NORMAL
SL AMB LEUKOCYTE ESTERASE,UA: NORMAL
SL AMB POCT BILIRUBIN,UA: NORMAL
SL AMB POCT BLOOD,UA: NORMAL
SL AMB POCT CLARITY,UA: CLEAR
SL AMB POCT COLOR,UA: YELLOW
SL AMB POCT KETONES,UA: NORMAL
SL AMB POCT NITRITE,UA: NORMAL
SL AMB POCT PH,UA: 5.5
SL AMB POCT SPECIFIC GRAVITY,UA: 1.02
SL AMB POCT URINE PROTEIN: NORMAL
SL AMB POCT UROBILINOGEN: 0.2

## 2019-05-22 PROCEDURE — 81003 URINALYSIS AUTO W/O SCOPE: CPT | Performed by: UROLOGY

## 2019-05-22 PROCEDURE — 99214 OFFICE O/P EST MOD 30 MIN: CPT | Performed by: UROLOGY

## 2019-05-22 RX ORDER — TAMSULOSIN HYDROCHLORIDE 0.4 MG/1
0.4 CAPSULE ORAL EVERY EVENING
Qty: 90 CAPSULE | Refills: 3 | Status: SHIPPED | OUTPATIENT
Start: 2019-05-22

## 2020-05-15 ENCOUNTER — TELEPHONE (OUTPATIENT)
Dept: UROLOGY | Facility: MEDICAL CENTER | Age: 60
End: 2020-05-15

## 2020-06-04 ENCOUNTER — TELEPHONE (OUTPATIENT)
Dept: UROLOGY | Facility: MEDICAL CENTER | Age: 60
End: 2020-06-04

## 2021-03-31 ENCOUNTER — IMMUNIZATIONS (OUTPATIENT)
Dept: FAMILY MEDICINE CLINIC | Facility: HOSPITAL | Age: 61
End: 2021-03-31

## 2021-03-31 DIAGNOSIS — Z23 ENCOUNTER FOR IMMUNIZATION: Primary | ICD-10-CM

## 2021-03-31 PROCEDURE — 0001A SARS-COV-2 / COVID-19 MRNA VACCINE (PFIZER-BIONTECH) 30 MCG: CPT

## 2021-03-31 PROCEDURE — 91300 SARS-COV-2 / COVID-19 MRNA VACCINE (PFIZER-BIONTECH) 30 MCG: CPT

## 2021-04-21 ENCOUNTER — IMMUNIZATIONS (OUTPATIENT)
Dept: FAMILY MEDICINE CLINIC | Facility: HOSPITAL | Age: 61
End: 2021-04-21

## 2021-04-21 DIAGNOSIS — Z23 ENCOUNTER FOR IMMUNIZATION: Primary | ICD-10-CM

## 2021-04-21 PROCEDURE — 0002A SARS-COV-2 / COVID-19 MRNA VACCINE (PFIZER-BIONTECH) 30 MCG: CPT

## 2021-04-21 PROCEDURE — 91300 SARS-COV-2 / COVID-19 MRNA VACCINE (PFIZER-BIONTECH) 30 MCG: CPT

## 2023-08-23 PROBLEM — Z12.11 COLON CANCER SCREENING: Status: ACTIVE | Noted: 2023-08-23

## 2023-10-22 PROBLEM — Z12.11 COLON CANCER SCREENING: Status: RESOLVED | Noted: 2023-08-23 | Resolved: 2023-10-22

## 2024-03-21 PROBLEM — A04.8 H. PYLORI INFECTION: Status: ACTIVE | Noted: 2024-03-21

## 2024-08-02 ENCOUNTER — APPOINTMENT (OUTPATIENT)
Dept: RADIOLOGY | Age: 64
End: 2024-08-02
Payer: COMMERCIAL

## 2024-08-02 ENCOUNTER — OCCMED (OUTPATIENT)
Dept: URGENT CARE | Age: 64
End: 2024-08-02
Payer: OTHER MISCELLANEOUS

## 2024-08-02 DIAGNOSIS — S49.91XA ARM INJURY, RIGHT, INITIAL ENCOUNTER: ICD-10-CM

## 2024-08-02 DIAGNOSIS — S49.91XA ARM INJURY, RIGHT, INITIAL ENCOUNTER: Primary | ICD-10-CM

## 2024-08-02 PROCEDURE — 99283 EMERGENCY DEPT VISIT LOW MDM: CPT

## 2024-08-02 PROCEDURE — 73080 X-RAY EXAM OF ELBOW: CPT

## 2024-08-02 PROCEDURE — 73090 X-RAY EXAM OF FOREARM: CPT

## 2024-08-02 PROCEDURE — G0382 LEV 3 HOSP TYPE B ED VISIT: HCPCS

## 2024-08-07 ENCOUNTER — APPOINTMENT (OUTPATIENT)
Dept: URGENT CARE | Age: 64
End: 2024-08-07
Payer: OTHER MISCELLANEOUS

## 2024-08-07 PROCEDURE — 99214 OFFICE O/P EST MOD 30 MIN: CPT | Performed by: PHYSICIAN ASSISTANT
